# Patient Record
Sex: MALE | Race: WHITE
[De-identification: names, ages, dates, MRNs, and addresses within clinical notes are randomized per-mention and may not be internally consistent; named-entity substitution may affect disease eponyms.]

---

## 2022-03-18 PROBLEM — R60.0 LOCALIZED EDEMA: Status: ACTIVE | Noted: 2019-04-29

## 2022-03-19 PROBLEM — R06.09 DYSPNEA ON EXERTION: Status: ACTIVE | Noted: 2019-04-29

## 2022-03-19 PROBLEM — I42.8 NONISCHEMIC CARDIOMYOPATHY (HCC): Status: ACTIVE | Noted: 2019-02-28

## 2022-03-19 PROBLEM — I44.7 LEFT BUNDLE BRANCH BLOCK: Status: ACTIVE | Noted: 2019-02-28

## 2022-03-19 PROBLEM — I50.23 ACUTE ON CHRONIC SYSTOLIC CONGESTIVE HEART FAILURE (HCC): Status: ACTIVE | Noted: 2019-04-29

## 2022-03-20 PROBLEM — Z95.810 IMPLANTABLE CARDIOVERTER-DEFIBRILLATOR (ICD) IN SITU: Status: ACTIVE | Noted: 2019-02-28

## 2022-03-20 PROBLEM — I50.41 ACUTE COMBINED SYSTOLIC AND DIASTOLIC ACC/AHA STAGE C CONGESTIVE HEART FAILURE (HCC): Status: ACTIVE | Noted: 2019-04-29

## 2022-03-20 PROBLEM — G47.30 SLEEP-DISORDERED BREATHING: Status: ACTIVE | Noted: 2019-04-29

## 2023-10-18 ENCOUNTER — NURSE TRIAGE (OUTPATIENT)
Dept: OTHER | Facility: CLINIC | Age: 58
End: 2023-10-18

## 2023-10-18 NOTE — TELEPHONE ENCOUNTER
[facility]: [facility]    Received call from [address] at [facility]; Patient with Red Flag Complaint requesting to establish care with new PCP at [facility] . Subjective: Caller states \"I moved her from [place] last September. I went to [facility]. When I was in [place] the doctor had me on Fentanyl pain patches for several years for protruding discs and nerve involvement. No one will give me prescriptions. I'm waling with a cane now because the pain is so bad. I'm constantly running to the bathroom because urine is coming out. I have to immediately go to the bathroom. \"     Current Symptoms: lower back pain-left side worse, bladder control issues-new onset (x1 month)    Hx of bulging discs with nerve involvement and chronic back pain; Patient previously had been on pain patches    Onset: 1 year ago; worsening    Associated Symptoms: reduced activity, increased wakefulness    Pain Severity: 9/10; radiating to back, lower leg(s)/toes, on left; constant-waxing and waning    Temperature: Denies    What has been tried: Advil, Tylenol, cane    LMP: NA Pregnant: NA    Recommended disposition: Go to ED Now. Patient states he may be able to go tomorrow. Writer reinforced being seen now d/t concern for current symptoms and fact that they have worsened. Patient verbalized understanding. Care advice provided, patient verbalizes understanding; denies any other questions or concerns; instructed to call back for any new or worsening symptoms. Patient/caller agrees to proceed to nearest Emergency Department. Attention Provider: Thank you for allowing me to participate in the care of your patient. The patient was connected to triage in response to information provided to the [facility]. Please do not respond through this encounter as the response is not directed to a shared pool.     Reason for Disposition   Loss of bladder or bowel control (urine or bowel incontinence; wetting self, leaking stool) of

## 2023-12-12 ENCOUNTER — HOSPITAL ENCOUNTER (OUTPATIENT)
Facility: HOSPITAL | Age: 58
Discharge: HOME | End: 2023-12-15
Payer: MEDICARE

## 2023-12-12 DIAGNOSIS — M54.17 RADICULOPATHY OF LUMBOSACRAL REGION: ICD-10-CM

## 2023-12-12 PROCEDURE — 72131 CT LUMBAR SPINE W/O DYE: CPT
